# Patient Record
(demographics unavailable — no encounter records)

---

## 2024-11-18 NOTE — ASSESSMENT
[FreeTextEntry1] : Mr. Veronica Mclean is a 57-year-old gentleman who presents today with a history of loss of libido and decreased sexual drive.  He states he has excellent erections.  Sexual activity has always been initiated by his wife.  This has been a source of conflict in the marriage.  They do not have children.  This was purposeful.  They are currently seeing a marriage counselor.  On examination his testes are large without masses.  His phallus is normal and uncircumcised.  His prostate is small smooth and benign.  He has mild urinary symptoms.  We discussed proceeding with endocrine studies.  He previously had studies 10 years ago by urologist in [place] these were reported as being normal.  We discussed the potential utilization of low-dose tadalafil for "penile engorgement" and sense of sexuality.  He is reluctant to take this.  He has had episodes of anxiety in the past. He wishes to continue with counseling prior to medical intervention  .We discussed Prostate cancer screening and patient wishes to proceed.  He does note occasional frequency.  He drinks large amount of tea in am.  He was unable to do a flow His symptoms are mild and he is not interested in medical management We will proceed with flow study at next visit.   Plan: 1.  Testosterone 2.  Prolactin 3.  Estradiol 4.  PSA 5.  +/- tadalafil 2.5 mg daily pending these results and outcome of marriage counseling.    11.18.2024 variable urinary symptoms seem to be related to caffeine strong stream S/p screening for [place] [facility] - PSA  "normal"  ? renal mass seen on [facility]  MRI reported as benign patient reports benign EMPHASIZED Need for review reports The VERONICA MCLEAN  expressed fully understanding of the information provided, the consequences and the management.  denies erectile dysfunction wife is initiates seeing sex therapist  Plan: 1. PSA 2/ thyroid 3. testosterone 4. MRI and usg     s

## 2024-11-18 NOTE — ASSESSMENT
[FreeTextEntry1] : Mr. Veronica Mclean is a 57-year-old gentleman who presents today with a history of loss of libido and decreased sexual drive.  He states he has excellent erections.  Sexual activity has always been initiated by his wife.  This has been a source of conflict in the marriage.  They do not have children.  This was purposeful.  They are currently seeing a marriage counselor.  On examination his testes are large without masses.  His phallus is normal and uncircumcised.  His prostate is small smooth and benign.  He has mild urinary symptoms.  We discussed proceeding with endocrine studies.  He previously had studies 10 years ago by urologist in [place] these were reported as being normal.  We discussed the potential utilization of low-dose tadalafil for "penile engorgement" and sense of sexuality.  He is reluctant to take this.  He has had episodes of anxiety in the past. He wishes to continue with counseling prior to medical intervention  .We discussed Prostate cancer screening and patient wishes to proceed.  He does note occasional frequency.  He drinks large amount of tea in am.  He was unable to do a flow His symptoms are mild and he is not interested in medical management We will proceed with flow study at next visit.   Plan: 1.  Testosterone 2.  Prolactin 3.  Estradiol 4.  PSA 5.  +/- tadalafil 2.5 mg daily pending these results and outcome of marriage counseling.    11.18.2024 variable urinary symptoms seem to be related to caffeine strong stream S/p screening for [employer] - PSA  "normal"  ? renal mass seen on [facility]  MRI reported as benign patient reports benign EMPHASIZED Need for review reports The VERONICA MCLEAN  expressed fully understanding of the information provided, the consequences and the management.  denies erectile dysfunction wife is initiates seeing sex therapist  Plan: 1. PSA 2/ thyroid 3. testosterone 4. MRI and usg     s

## 2024-11-18 NOTE — ADDENDUM
[FreeTextEntry1] : FLOW  7.5 cc volume 228 PVR 42 I discussed with VERONICA HAMMOND the possible indications for treatment of prostatic obstruction includin urinary symptoms and quality of life issues 2.  Obstruction and urinary retention 3.  Infections 4.  Bladder stones 5.  Upper tract changes including hydronephrosis and renal dysfunction We discussed the indications in his case. Patient wants to defer treatment

## 2024-11-18 NOTE — HISTORY OF PRESENT ILLNESS
[FreeTextEntry1] : 57year old   x 17 years no children "never ready" to have children currently complaining of low libido "wife feels this has been forever" no erectile dysfunction excellent erections wife is the initiator throughout marriage sexual intercourse once month masturbates weekly "dealing with this x 10 years) seen by urologist 10 years ago - everything ok working with marriage counselor x 4 months  11.18.2024 patient has not followed up "hypothyroid" continues to complain of low libido ultrasound re :LFTs ? renal mass underwent MRI  with reported as normal one month ago

## 2025-05-19 NOTE — ASSESSMENT
[FreeTextEntry1] : Mr. Veronica cMlean is a 57-year-old gentleman who presents today with a history of loss of libido and decreased sexual drive.  He states he has excellent erections.  Sexual activity has always been initiated by his wife.  This has been a source of conflict in the marriage.  They do not have children.  This was purposeful.  They are currently seeing a marriage counselor.  On examination his testes are large without masses.  His phallus is normal and uncircumcised.  His prostate is small smooth and benign.  He has mild urinary symptoms.  We discussed proceeding with endocrine studies.  He previously had studies 10 years ago by urologist in [place] these were reported as being normal.  We discussed the potential utilization of low-dose tadalafil for "penile engorgement" and sense of sexuality.  He is reluctant to take this.  He has had episodes of anxiety in the past. He wishes to continue with counseling prior to medical intervention  .We discussed Prostate cancer screening and patient wishes to proceed.  He does note occasional frequency.  He drinks large amount of tea in am.  He was unable to do a flow His symptoms are mild and he is not interested in medical management We will proceed with flow study at next visit.   Plan: 1.  Testosterone 2.  Prolactin 3.  Estradiol 4.  PSA 5.  +/- tadalafil 2.5 mg daily pending these results and outcome of marriage counseling.    11.18.2024 variable urinary symptoms seem to be related to caffeine strong stream S/p screening for [facility] - PSA  "normal"  ? renal mass seen on [facility]  MRI reported as benign patient reports benign EMPHASIZED Need for review reports The VERONICA MCLEAN  expressed fully understanding of the information provided, the consequences and the management.  denies erectile dysfunction wife is initiates seeing sex therapist  Plan: 1. PSA 2/ thyroid 3. testosterone 4. MRI and usg    5.19.2025 patient returns with wiife PSA normal Thyroid normal discussed? usg again reports NORMAL MRI; did no bring results patient states he forgot to get testosterone normal reviewed libido elevated TSH on one occasion couple goes to couple's therapist  in therapy for the last year discucsssed low dose daily tadalafil Discussed PD5-I possible side effects, onset of action, duration of action, and food interactions.  Discussed behavioral strategies to optimize efficacy of medication.   DIscussed the potential advantages and disadvantages as well as side effect profiles of each. Warned re priapism and need for intervention to prevent permanent penile injury, discussed ? impact on libido in light of enhanced responsiveness  Plan: 1. thyroid 2 urinalysis

## 2025-06-23 NOTE — ASSESSMENT
[FreeTextEntry1] : Mr. Veronica Mclean is a 57-year-old gentleman who presents today with a history of loss of libido and decreased sexual drive.  He states he has excellent erections.  Sexual activity has always been initiated by his wife.  This has been a source of conflict in the marriage.  They do not have children.  This was purposeful.  They are currently seeing a marriage counselor.  On examination his testes are large without masses.  His phallus is normal and uncircumcised.  His prostate is small smooth and benign.  He has mild urinary symptoms.  We discussed proceeding with endocrine studies.  He previously had studies 10 years ago by urologist in [place] these were reported as being normal.  We discussed the potential utilization of low-dose tadalafil for "penile engorgement" and sense of sexuality.  He is reluctant to take this.  He has had episodes of anxiety in the past. He wishes to continue with counseling prior to medical intervention  .We discussed Prostate cancer screening and patient wishes to proceed.  He does note occasional frequency.  He drinks large amount of tea in am.  He was unable to do a flow His symptoms are mild and he is not interested in medical management We will proceed with flow study at next visit.   Plan: 1.  Testosterone 2.  Prolactin 3.  Estradiol 4.  PSA 5.  +/- tadalafil 2.5 mg daily pending these results and outcome of marriage counseling.    11.18.2024 variable urinary symptoms seem to be related to caffeine strong stream S/p screening for [facility] - PSA  "normal"  ? renal mass seen on [facility]  MRI reported as benign patient reports benign EMPHASIZED Need for review reports The VERONICA MCLEAN  expressed fully understanding of the information provided, the consequences and the management.  denies erectile dysfunction wife is initiates seeing sex therapist  Plan: 1. PSA 2/ thyroid 3. testosterone 4. MRI and usg    5.19.2025 patient returns with wiife PSA normal Thyroid normal discussed? usg again reports NORMAL MRI; did no bring results patient states he forgot to get testosterone normal reviewed libido elevated TSH on one occasion couple goes to couple's therapist  in therapy for the last year discussed low dose daily tadalafil Discussed PD5-I possible side effects, onset of action, duration of action, and food interactions.  Discussed behavioral strategies to optimize efficacy of medication.   DIscussed the potential advantages and disadvantages as well as side effect profiles of each. Warned re priapism and need for intervention to prevent permanent penile injury, discussed ? impact on libido in light of enhanced responsiveness  Plan: 1. thyroid 2 urinalysis   6.23.2025 patient returns  "picked up" tadalafil did not take medication "we were having a rough patch" referred to endocrinology re thyroid appt 8.28 with Dr Barnett  MRI report reviewed simple cyst renal ultrasound - "solid mass"  Ultrasound was performed today.  This demonstrates a "dromedary hump".  This is consistent in size with the previous reported solid mass on ultrasound.  There was a subsequent MRI which demonstrated a left upper pole renal cyst less than 1 cm.  This was seen on today's ultrasound.  These findings are consistent and stable.  This will just be monitored at this time.  Plan: 1.  Tadalafil 5 mg daily 2.  Follow-up Dr. Barnett re: hypothyroidism 3.  Follow-up after seeing Dr. Barnett